# Patient Record
Sex: FEMALE | Race: BLACK OR AFRICAN AMERICAN | NOT HISPANIC OR LATINO | URBAN - METROPOLITAN AREA
[De-identification: names, ages, dates, MRNs, and addresses within clinical notes are randomized per-mention and may not be internally consistent; named-entity substitution may affect disease eponyms.]

---

## 2020-05-12 ENCOUNTER — PREPPED CHART (OUTPATIENT)
Dept: URBAN - METROPOLITAN AREA CLINIC 19 | Facility: CLINIC | Age: 65
End: 2020-05-12

## 2022-04-07 ASSESSMENT — KERATOMETRY
OS_AXISANGLE2_DEGREES: 61
OS_K1POWER_DIOPTERS: 42.75
OS_K2POWER_DIOPTERS: 44.25
OS_AXISANGLE_DEGREES: 151

## 2022-04-07 ASSESSMENT — VISUAL ACUITY
OD_CC: 20/20-2
OS_CC: 20/20
OU_CC: J1+

## 2022-04-07 ASSESSMENT — TONOMETRY
OD_IOP_MMHG: 8
OS_IOP_MMHG: 9

## 2023-01-18 ENCOUNTER — ESTABLISHED PATIENT (OUTPATIENT)
Dept: URBAN - METROPOLITAN AREA CLINIC 19 | Facility: CLINIC | Age: 68
End: 2023-01-18

## 2023-01-18 DIAGNOSIS — H43.393: ICD-10-CM

## 2023-01-18 DIAGNOSIS — H35.363: ICD-10-CM

## 2023-01-18 DIAGNOSIS — E10.9: ICD-10-CM

## 2023-01-18 PROCEDURE — 92014 COMPRE OPH EXAM EST PT 1/>: CPT

## 2023-01-18 PROCEDURE — 92250 FUNDUS PHOTOGRAPHY W/I&R: CPT

## 2023-01-18 ASSESSMENT — VISUAL ACUITY
OS_CC: 20/100-2
OS_SC: 20/30-1
OS_PH: 20/50
OD_CC: 20/200
OD_SC: 20/25-1
OD_OTHER: OTC +2.50
OU_OTHER: OTC +2.50
OD_PH: 20/50
OU_CC: 20/25
OS_OTHER: OTC +2.50

## 2023-01-18 ASSESSMENT — KERATOMETRY
OS_AXISANGLE2_DEGREES: 53
OD_K2POWER_DIOPTERS: 44.00
OD_AXISANGLE_DEGREES: 154
OD_K1POWER_DIOPTERS: 43.25
OS_K2POWER_DIOPTERS: 44.25
OS_AXISANGLE_DEGREES: 143
OD_AXISANGLE2_DEGREES: 64
OS_K1POWER_DIOPTERS: 43.00

## 2023-01-18 ASSESSMENT — TONOMETRY
OD_IOP_MMHG: 12
OS_IOP_MMHG: 12

## 2023-01-18 NOTE — PATIENT DISCUSSION
after discussion about not wearing her readers for distance, patient was offered a refraction for bifocals.  Patient declined and wants to keep using readers for reading only.

## 2024-05-02 ENCOUNTER — ESTABLISHED PATIENT (OUTPATIENT)
Dept: URBAN - METROPOLITAN AREA CLINIC 19 | Facility: CLINIC | Age: 69
End: 2024-05-02

## 2024-05-02 DIAGNOSIS — E10.9: ICD-10-CM

## 2024-05-02 DIAGNOSIS — H35.363: ICD-10-CM

## 2024-05-02 DIAGNOSIS — H43.393: ICD-10-CM

## 2024-05-02 PROCEDURE — 92250 FUNDUS PHOTOGRAPHY W/I&R: CPT

## 2024-05-02 PROCEDURE — 92014 COMPRE OPH EXAM EST PT 1/>: CPT

## 2024-05-02 ASSESSMENT — KERATOMETRY
OD_AXISANGLE2_DEGREES: 87
OS_K1POWER_DIOPTERS: 43.00
OS_AXISANGLE_DEGREES: 137
OD_K2POWER_DIOPTERS: 43.00
OD_K1POWER_DIOPTERS: 43.75
OD_AXISANGLE_DEGREES: 177
OS_AXISANGLE2_DEGREES: 47
OS_K2POWER_DIOPTERS: 45.00

## 2024-05-02 ASSESSMENT — TONOMETRY
OS_IOP_MMHG: 13
OD_IOP_MMHG: 12

## 2024-05-02 ASSESSMENT — VISUAL ACUITY
OU_SC: 20/40
OS_SC: 20/40
OD_SC: 20/30